# Patient Record
Sex: FEMALE | Race: WHITE | Employment: FULL TIME | URBAN - METROPOLITAN AREA
[De-identification: names, ages, dates, MRNs, and addresses within clinical notes are randomized per-mention and may not be internally consistent; named-entity substitution may affect disease eponyms.]

---

## 2020-11-25 ENCOUNTER — HOSPITAL ENCOUNTER (EMERGENCY)
Age: 35
Discharge: HOME | End: 2020-11-25
Attending: EMERGENCY MEDICINE
Payer: COMMERCIAL

## 2020-11-25 VITALS
BODY MASS INDEX: 34.86 KG/M2 | SYSTOLIC BLOOD PRESSURE: 142 MMHG | DIASTOLIC BLOOD PRESSURE: 100 MMHG | RESPIRATION RATE: 16 BRPM | TEMPERATURE: 96.8 F | WEIGHT: 230 LBS | HEART RATE: 102 BPM | OXYGEN SATURATION: 98 % | HEIGHT: 68 IN

## 2020-11-25 PROCEDURE — 99284 EMERGENCY DEPT VISIT MOD MDM: CPT

## 2020-11-25 PROCEDURE — 6370000000 HC RX 637 (ALT 250 FOR IP): Performed by: EMERGENCY MEDICINE

## 2020-11-25 RX ORDER — NAPROXEN 500 MG/1
500 TABLET ORAL ONCE
Status: COMPLETED | OUTPATIENT
Start: 2020-11-25 | End: 2020-11-25

## 2020-11-25 RX ADMIN — NAPROXEN 500 MG: 500 TABLET ORAL at 22:22

## 2020-11-25 SDOH — HEALTH STABILITY: MENTAL HEALTH: HOW OFTEN DO YOU HAVE A DRINK CONTAINING ALCOHOL?: NEVER

## 2020-11-25 ASSESSMENT — PAIN DESCRIPTION - LOCATION: LOCATION: HEAD

## 2020-11-25 ASSESSMENT — PAIN DESCRIPTION - PAIN TYPE: TYPE: ACUTE PAIN

## 2020-11-25 ASSESSMENT — PAIN SCALES - GENERAL: PAINLEVEL_OUTOF10: 3

## 2020-11-26 NOTE — ED PROVIDER NOTES
EMERGENCY DEPARTMENT ENCOUNTER    Patient: Carmen Bolaños  MRN: [id number]  : 1985  Date of Evaluation: 2020  ED Provider:  Corie Campbell    CHIEF COMPLAINT  Chief Complaint   Patient presents with    Other     reports bump on posterior head. NKI       HPI  Carmen Bolaños is a 28 y.o. female who presents with a moderately tender \"bump\" on the left posterior scalp with onset yesterday. Worsened with palpation. States that she felt a tender lump in this location yesterday but now here in the emergency department she is unable to feel it. She does still continue to have a moderate severity left-sided headache extending from this location. Headache has been gradual in development. Denies any other associated symptoms or complaints or concerns. REVIEW OF SYSTEMS    Constitutional: negative for fever, chills  Neurological: negative for focal weakness, loss of sensation  Ophthalmic: negative for vision change  ENT: negative for congestion, rhinorrhea  Cardiovascular: negative for chest pain  Respiratory: negative for SOB, cough  GI: negative for abdominal pain, nausea, vomiting, diarrhea, constipation  : negative for dysuria, hematuria  Musculoskeletal: negative for myalgias, decreased ROM, joint swelling  Dermatological: negative for rash, wounds  Heme: Negative for bleeding, bruising      PAST MEDICAL HISTORY  History reviewed. No pertinent past medical history. CURRENT MEDICATIONS  [unfilled]    ALLERGIES  No Known Allergies    SURGICAL HISTORY  History reviewed. No pertinent surgical history. FAMILY HISTORY  History reviewed. No pertinent family history.     SOCIAL HISTORY  Social History     Socioeconomic History    Marital status: Single     Spouse name: None    Number of children: None    Years of education: None    Highest education level: None   Occupational History    None   Social Needs    Financial resource strain: None    Food insecurity     Worry: None     Inability: None    Transportation needs     Medical: None     Non-medical: None   Tobacco Use    Smoking status: Current Every Day Smoker    Smokeless tobacco: Never Used   Substance and Sexual Activity    Alcohol use: Never     Frequency: Never    Drug use: Never    Sexual activity: None   Lifestyle    Physical activity     Days per week: None     Minutes per session: None    Stress: None   Relationships    Social connections     Talks on phone: None     Gets together: None     Attends Gnosticist service: None     Active member of club or organization: None     Attends meetings of clubs or organizations: None     Relationship status: None    Intimate partner violence     Fear of current or ex partner: None     Emotionally abused: None     Physically abused: None     Forced sexual activity: None   Other Topics Concern    None   Social History Narrative    None         **Past medical, family and social histories, and nursing notes reviewed and verified by me**      PHYSICAL EXAM  VITAL SIGNS:   ED Triage Vitals [11/25/20 2200]   Enc Vitals Group      BP (!) 142/100      Pulse 102      Resp 16      Temp 96.8 °F (36 °C)      Temp Source Tympanic      SpO2 98 %      Weight 230 lb (104.3 kg)      Height 5' 8\" (1.727 m)      Head Circumference       Peak Flow       Pain Score       Pain Loc       Pain Edu? Excl. in [address]? Vitals during ED course were reviewed and are as charted. Constitutional: Minimal distress, Non-toxic appearance  Eyes: Conjunctiva normal, No discharge  HENT: Normocephalic, Atraumatic, scalp appears normal, no erythema or swelling or palpable masses. No induration or fluctuance or crepitus.   Bilateral external ears normal, bilateral external auditory canals appear normal, bilateral TMs appear normal, no tenderness to percussion over bilateral mastoid processes, posterior oropharynx is nonerythematous and without exudate, uvula is midline, oropharynx moist  Neck: Supple, no midline cervical spinal secondary cause of headache, including, but not limited to: cerebral aneurysm, subarachnoid hemorrhage, subdural and epidural hematoma, space-occupying lesions, glaucoma, temporal arteritis, venous/cavernous thrombosis, meningitis/encephalitis, carbon monoxide toxicity and idiopathic intracranial hypertension (pseudotumor cerebri). Neurological examination is intact and non-focal.    The risk of further workup or hospitalization is likely higher than the risk of the patient having a malignant or serious secondary cause of headache. I, therefore, believe it is in the patients best interest not to do additional emergent testing, including but not limited to cranial imaging and or lumbar puncture here in the emergency department, based on the patient's presentation, history, physical exam and emergency department course. This was discussed with the patient and they understand and agree. Additional workup and treatment in the ED as documented above. Patient reassured and will be discharged home. I have explained to the patient in appropriate terminology our work-up in the ED and their diagnosis. I have also given anticipatory guidance and expectant management of their condition as an outpatient as per my custom. The patient was given clear discharge and follow-up instructions including return to the ER immediately for worsening concerns. The patient has been advised to follow-up with their primary care physician and/or referred physician in the next two to three days or sooner if worsening and to return to the ER immediately as above with any concerns. I provided the patient counseling with regard to my customary list of strict return precautions as well as return precautions specific to the cause for today's emergency department visit. The patient will return under these provided conditions, but should also return for new concerns or further worsening.  Pt and/or family understand and agree with plan.        Clinical Impression:  1. Nonintractable headache, unspecified chronicity pattern, unspecified headache type        Disposition referral (if applicable): Your Primary care provider    Schedule an appointment as soon as possible for a visit       [place] [id number]  [address]  [phone]    If symptoms worsen      Disposition medications (if applicable):  New Prescriptions    No medications on file       ED Provider Disposition Time  DISPOSITION Decision To Discharge 11/25/2020 10:08:44 PM          Electronically signed by: Ileana Phipps M.D., 11/25/2020 10:11 PM      This dictation was created with voice recognition software. While attempts have been made to review the dictation as it is transcribed, on occasion the spoken word can be misinterpreted by the technology leading to omissions or inappropriate words, phrases or sentences.       Gabbie Hurtado MD  11/25/20 4551